# Patient Record
(demographics unavailable — no encounter records)

---

## 2024-10-10 NOTE — PHYSICAL EXAM
[General Appearance - In No Acute Distress] : in no acute distress [Sclera] : the sclera and conjunctiva were normal [Auscultation Breath Sounds / Voice Sounds] : lungs were clear to auscultation bilaterally [Heart Rate And Rhythm] : heart rate was normal and rhythm regular [Heart Sounds] : normal S1 and S2 [Heart Sounds Gallop] : no gallops [Abdomen Soft] : soft [Abdomen Tenderness] : non-tender [Cervical Lymph Nodes Enlarged Posterior Bilaterally] : posterior cervical [Cervical Lymph Nodes Enlarged Anterior Bilaterally] : anterior cervical [Supraclavicular Lymph Nodes Enlarged Bilaterally] : supraclavicular [Musculoskeletal - Swelling] : no joint swelling seen [] : no rash [Skin Lesions] : no skin lesions [Oriented To Time, Place, And Person] : oriented to person, place, and time

## 2024-10-10 NOTE — CONSULT LETTER
[Dear  ___] : Dear  [unfilled], [Consult Letter:] : I had the pleasure of evaluating your patient, [unfilled]. [Consult Closing:] : Thank you very much for allowing me to participate in the care of this patient.  If you have any questions, please do not hesitate to contact me. [Sincerely,] : Sincerely, [FreeTextEntry3] : Jean-Pierre Kay MD

## 2024-10-10 NOTE — DATA REVIEWED
[FreeTextEntry1] : Labs reviewed from 9/24 ARIELLA, ESR, CRP wnl   Outside records reviewed scanned 8/24 sigmoid/rectal bx showing mild increase in acute inflammatory cells in lamina propria, rare cryptitis, no crypt abscesses seen. No chronicity seen. No granuloma/dysplasia seen. Focal activity colitis.;   distal esophagus: fragment of squamous and mucosa w congestion basal cell hyperplasia, non specific reactive changes  sigmoid/rectum: mild increase in acute inflammatory cells in lamina propria, rare cryptitis, no crypt abscesses no chronicity, granuloma or dysplasia seen. Changes consistent w focal active colitis.    EGG: congested, friable, granular, longitudinally marked, thigh circumferentially folds, white speckled mucosa in esophagus   colonoscopy 4/24 congested, friable, granular, friable, nodular, plaque covered, petechial and thickened folds of mucosa in rectum

## 2024-10-10 NOTE — ASSESSMENT
[FreeTextEntry1] : 19 y/o M w UC, w persistent arthrlagias =pain in knees, shins, ankles =worse w activities =no stiffness, swelling =improved w steroids ************************************************* =hematochezia, abdominal pain =colonoscopy w biopsies w colitis of sigmoid and proctitis  Suspect IBD related arthritis, although strange that pain is in entire leg. Referred pain?  Explained that IBD can also present w arthritis, sometimes symptoms correlate w GI activity and sometimes symptoms are independent of activity of IBD. Suspect this is cause of patient's symptoms. Other consideration is AVN (although pt only on 2 months of chronic steroids) vs other inflammatory arthritis.   Will add ssz for IBD related arthritis. Will obtain xray to r/o AVN. Pt wishes rapid relief, which would be provided by low dose steroids, but will touch base w GI first.   Plan -Labs w serologies, inflammatory markers -Xrays knees/ ankles/feet  RTO depending on above results

## 2024-10-10 NOTE — HISTORY OF PRESENT ILLNESS
[FreeTextEntry1] : 18 M hx of UC here for consultation  Pt had symptoms of abdominal pain, hematochezia since 12/23. Pt was initially dx as constipation, but was having weight loss. Had endoscopic studies done 4/4 and was found to have gastritis and UC. Pt initially was hospitalized and given IV steroids on 5/24. However, only placed on mesalamine. On 8/24, pt estalbished w [facility], and pt has been placed on skyrizi (has gotten 2 loading doses) and tapered off steroids over 2 months (August, Sept).   On 5/24 pt developed knee, shin, and ankle pain. Worse w activities. No stiffness, swelling. No change in intensity w time of day. Pt does report resolution of symptoms on high dose steroids.   No fevers, h/a, rashes, psoriasis, red painful eyes. hair loss, oral ulcers, epistaxis, sinusitis,  swollen glands, dry mouth, dry eyes, CP, SOB, cough, vision changes, abdominal pain, GERD, n/v/d, blood in stool or urine, focal weakness, sensory loss,  Raynaud's,, weight loss.

## 2024-10-17 NOTE — ASSESSMENT
[FreeTextEntry1] : 18M, UC on skyrizi, joint pains, here today for f/u visit  # UC - diagnosed in 2023 - Notes from Peds GI --> sigmoid and rectal inflammation - uncontrolled symptoms on oral+rectal mesalamine - started on skyrizi, now s/p induction dose x 2  - will arrange for RN teaching for maintenance skyrizi  - preventative care at next visit - plan for cscope in 6mos - 1 yr to assess response   # Joint pain - following w rheum - thought to be IBD related  - currently on sulfasalazine - feels that effect wears off quickly.  - Currently on sulfasalazine 500mg bid, will discuss dosing w Dr Kay   # Abnl imaging  - CT abd/pelvis Sep 2024 - metallic ovoid structure in ascending colon, small bowel endoscopy capsule questioned  - will order abdominal xray - patient will fax over prior VCE results to our office   RTC after skyrizi maintenance injection

## 2024-10-17 NOTE — HISTORY OF PRESENT ILLNESS
[FreeTextEntry1] : Here for f/u visit  s/p skyrizi infusion x 2, third dose pending later this month  saw rheumatology, symptoms thought to be IBD related arthritis and started on sulfasalazine.  He feels that sulfasalazine has been helping but that the medication effect wears off fairly quickly. Currently only on sulfasalazine 500mg bid  He will discuss dose increase with his rheumatologist.  States that bowel movements have improved since starting Skyrizi. Reports 1-2 soft bowel movements per day. Now rarely sees blood in the stool, last episode was about 1 week ago.  At that time noticed small-volume rectal bleeding. Abdominal pain has resolved. Denies nausea, vomiting, fevers, chills, rashes, vision changes, weight loss.

## 2024-10-24 NOTE — HISTORY OF PRESENT ILLNESS
[Denies] : Denies [No] : No [Yes] : Yes [Declined] : Declined [TB] : Tuberculosis screening [Total Hep B core AB] : total Hepatitis B Core antibody [Left upper extremity] : Left upper extremity [22g] : 22g [Start Time: ___] : Medication Start Time: [unfilled] [End Time: ___] : Medication End Time: [unfilled] [Medication Name: ___] : Medication Name: [unfilled] [Total Amount Administered: ___] : Total Amount Administered: [unfilled] [IV discontinued. Intact. No signs or symptoms of IV complications noted. Time: ___] : IV discontinued. Intact. No signs or symptoms of IV complications noted. Time: [unfilled] [Patient  instructed to seek medical attention with signs and symptoms of adverse effects] : Patient  instructed to seek medical attention with signs and symptoms of adverse effects [Patient left unit in no acute distress] : Patient left unit in no acute distress [Medications administered as ordered and tolerated well.] : Medications administered as ordered and tolerated well. [de-identified] : 1014 [de-identified] : Pt presented for infusion. Educated to inform RN staff if adverse sx present during infusion, or to call prescriber if sx present after discharge. Pt verbalized understanding. Discussed administration date of first injection with patient. Patient states he has been in touch with pharmacy to receive injections.

## 2025-01-17 NOTE — DATA REVIEWED
[FreeTextEntry1] : Labs reviewed from 10/24 CBC, CMP, ESR, CRP, HLAB27 negative  xrays 10/24 knees wnl  feet/ankle: wnl

## 2025-01-17 NOTE — ASSESSMENT
[FreeTextEntry1] : 17 y/o M w UC, IBD arthritis/enthesitis =pain in knees, shins, ankles, Achilles insertion site  =labs 10/24 CBC, CMP, ESR, CRP, HLAB27 negative xrays 10/24 knees wnl  feet/ankle: wnl  =improved w steroids ************************************************* =hematochezia, abdominal pain =colonoscopy w biopsies w colitis of sigmoid and proctitis  Pt still symptomatic. will increase ssz If sitll symptomatic, would like to confirm synovitis/enthesitis w MRI (ankle MRI?) before switching to another biologic to cover both GI and msk manifestations of dz.   Plan  Labs to measure disease activity and monitor for drug toxicities  skyrizi 360mg every 8 weeks increase ssz 1.5mg BID RTO in 3 months

## 2025-01-17 NOTE — HISTORY OF PRESENT ILLNESS
[___ Month(s) Ago] : [unfilled] month(s) ago [FreeTextEntry1] : =pt taking ssz 1000mg BID which was initially working but for past 2 weeks, he seemed like he flared =pt on skyrizi 360mg every 8 weeks =pt states calves, and shins

## 2025-02-03 NOTE — ASSESSMENT
[FreeTextEntry1] : 18M, UC on skyrizi, joint pains, here today for f/u visit  # UC - diagnosed in 2023 - Notes from Peds GI --> sigmoid and rectal inflammation - uncontrolled symptoms on oral+rectal mesalamine - started on skyrizi August 2024 as he had colitis and joint/LE pain - schedule cscope for intraluminal eval. R/B discussed   # Preventative care  - follows w pcp for vaccines  - follows w derm  # Joint pain - following w rheum - thought to be IBD related - currently on sulfasalazine   # Heartburn  - doing well on omeprazole 40mg po qday but feels that he significant symptoms if he stops medication  - will schedule EGD at time of cscope for intraluminal eval. R/B discussed   RTC after egd/colon

## 2025-02-03 NOTE — HISTORY OF PRESENT ILLNESS
[FreeTextEntry1] : Here for f/u visit  Feels well overall  Following w rheum for joint pain/ LE pain. On sulfasalazine w recent increase in dose  Bowel movements have been normal 1-2 x per day No blood in stool Denies abd pain, n/v, rashes, vision changes, wt loss   No issues w skyrizi injections

## 2025-07-09 NOTE — HISTORY OF PRESENT ILLNESS
Recommend e-visit for vaginal discharge, please let patient know, thank you!   [FreeTextEntry1] : 19 M h/o arthritis, GERD, dermatitis, UC on Skyrizi here to establish care with me (pt of Dr Beattys). Delay in Skyrizi missed March and May doses just restarted since Jan for first time last week.    Plan at last appointment Feb 2025: ECO for GERD, dz assessment  Current status: better   BM/D: 3 (baseline 1-2 before dx) BM/night: 0 Urgency: occ Incontinence: no Blood: no Mucus: no Weight loss: good weight 145; gained on pred (as low as 120 when sick) Fatigue: +; rests well, drinks water, exercises   Joint aches: Rahul (Denair) Rheum-- no findings thinks 2/2 colitis - sulfasalazine Skin issues: no Eye issues:  no       IBD Hx: Initial dx:2023 UC:L colitis Presentation: blood in stool Meds failed: 5-ASA, prednisone Current meds: Skyrizi 8/24- Last date administered: last week (delay, last time 1/25) Last level checked: Last colonoscopy: May 2025 no dz activity TI or any segment Last imaging:n/a   Surgery: no     TB:-ve 8/24 HBV:-ve 8/24 Shingrix: PNA:   Labs:  Jan 2025 labs: nl CBC, CMP, ESR, CRP

## 2025-07-09 NOTE — PLAN
[TextEntry] : labs in 4-6 weeks since currently still adj to Skyrizi restart will need Xsectional imaging at some point - can discuss at next appt f/u 6 mo shingrix derm, ophtho recs encouraged nutrition f/u since gained 20 lb with 6 mo pred and now unable to lose

## 2025-07-09 NOTE — ASSESSMENT
[FreeTextEntry1] : 19 M h/o arthritis, GERD, dermatitis, UC on Skyrizi here to establish care with me (pt of Dr Membreno's). Skiiped 2 Skyrizi doses due to insurance, some fatigue and urgency only active issues.